# Patient Record
Sex: MALE | Race: WHITE | Employment: OTHER | URBAN - METROPOLITAN AREA
[De-identification: names, ages, dates, MRNs, and addresses within clinical notes are randomized per-mention and may not be internally consistent; named-entity substitution may affect disease eponyms.]

---

## 2017-07-06 ENCOUNTER — TRANSFERRED RECORDS (OUTPATIENT)
Dept: HEALTH INFORMATION MANAGEMENT | Facility: CLINIC | Age: 67
End: 2017-07-06

## 2017-07-26 ENCOUNTER — TRANSFERRED RECORDS (OUTPATIENT)
Dept: HEALTH INFORMATION MANAGEMENT | Facility: CLINIC | Age: 67
End: 2017-07-26

## 2020-08-18 ENCOUNTER — APPOINTMENT (OUTPATIENT)
Dept: CT IMAGING | Facility: HOSPITAL | Age: 70
End: 2020-08-18
Attending: FAMILY MEDICINE
Payer: MEDICARE

## 2020-08-18 ENCOUNTER — HOSPITAL ENCOUNTER (EMERGENCY)
Facility: HOSPITAL | Age: 70
Discharge: LEFT BEFORE DISCHARGE | End: 2020-08-18
Attending: FAMILY MEDICINE | Admitting: FAMILY MEDICINE
Payer: MEDICARE

## 2020-08-18 VITALS
DIASTOLIC BLOOD PRESSURE: 69 MMHG | TEMPERATURE: 97.9 F | RESPIRATION RATE: 7 BRPM | SYSTOLIC BLOOD PRESSURE: 129 MMHG | OXYGEN SATURATION: 97 % | HEART RATE: 76 BPM

## 2020-08-18 DIAGNOSIS — Z93.3 COLOSTOMY PRESENT (H): ICD-10-CM

## 2020-08-18 DIAGNOSIS — Z53.29 LEFT AGAINST MEDICAL ADVICE: Primary | ICD-10-CM

## 2020-08-18 DIAGNOSIS — Z93.59 SUPRAPUBIC CATHETER (H): ICD-10-CM

## 2020-08-18 DIAGNOSIS — K86.89 PANCREATIC MASS: ICD-10-CM

## 2020-08-18 DIAGNOSIS — R17 JAUNDICE: ICD-10-CM

## 2020-08-18 LAB
ALBUMIN SERPL-MCNC: 3.9 G/DL (ref 3.4–5)
ALBUMIN UR-MCNC: 30 MG/DL
ALP SERPL-CCNC: 222 U/L (ref 40–150)
ALT SERPL W P-5'-P-CCNC: 300 U/L (ref 0–70)
AMMONIA PLAS-SCNC: <10 UMOL/L (ref 10–50)
AMORPH CRY #/AREA URNS HPF: ABNORMAL /HPF
ANION GAP SERPL CALCULATED.3IONS-SCNC: 9 MMOL/L (ref 3–14)
APPEARANCE UR: ABNORMAL
AST SERPL W P-5'-P-CCNC: 162 U/L (ref 0–45)
BACTERIA #/AREA URNS HPF: ABNORMAL /HPF
BASOPHILS # BLD AUTO: 0.1 10E9/L (ref 0–0.2)
BASOPHILS NFR BLD AUTO: 2 %
BILIRUB SERPL-MCNC: 21.6 MG/DL (ref 0.2–1.3)
BILIRUB UR QL STRIP: ABNORMAL
BUN SERPL-MCNC: 11 MG/DL (ref 7–30)
CALCIUM SERPL-MCNC: 9.8 MG/DL (ref 8.5–10.1)
CHLORIDE SERPL-SCNC: 101 MMOL/L (ref 94–109)
CO2 SERPL-SCNC: 24 MMOL/L (ref 20–32)
COLOR UR AUTO: ABNORMAL
CREAT SERPL-MCNC: 0.74 MG/DL (ref 0.66–1.25)
DIFFERENTIAL METHOD BLD: NORMAL
EOSINOPHIL # BLD AUTO: 0.1 10E9/L (ref 0–0.7)
EOSINOPHIL NFR BLD AUTO: 2 %
ERYTHROCYTE [DISTWIDTH] IN BLOOD BY AUTOMATED COUNT: 14.5 % (ref 10–15)
GFR SERPL CREATININE-BSD FRML MDRD: >90 ML/MIN/{1.73_M2}
GLUCOSE SERPL-MCNC: 100 MG/DL (ref 70–99)
GLUCOSE UR STRIP-MCNC: NEGATIVE MG/DL
HCT VFR BLD AUTO: 40.6 % (ref 40–53)
HGB BLD-MCNC: 13.8 G/DL (ref 13.3–17.7)
HGB UR QL STRIP: ABNORMAL
INR PPP: 1.11 (ref 0.86–1.14)
KETONES UR STRIP-MCNC: 10 MG/DL
LEUKOCYTE ESTERASE UR QL STRIP: ABNORMAL
LIPASE SERPL-CCNC: 177 U/L (ref 73–393)
LYMPHOCYTES # BLD AUTO: 1.4 10E9/L (ref 0.8–5.3)
LYMPHOCYTES NFR BLD AUTO: 23 %
MCH RBC QN AUTO: 31.2 PG (ref 26.5–33)
MCHC RBC AUTO-ENTMCNC: 34 G/DL (ref 31.5–36.5)
MCV RBC AUTO: 92 FL (ref 78–100)
MONOCYTES # BLD AUTO: 0.1 10E9/L (ref 0–1.3)
MONOCYTES NFR BLD AUTO: 2 %
MUCOUS THREADS #/AREA URNS LPF: PRESENT /LPF
NEUTROPHILS # BLD AUTO: 4.2 10E9/L (ref 1.6–8.3)
NEUTROPHILS NFR BLD AUTO: 71 %
NITRATE UR QL: POSITIVE
PH UR STRIP: 5.5 PH (ref 4.7–8)
PLATELET # BLD AUTO: 237 10E9/L (ref 150–450)
POTASSIUM SERPL-SCNC: 4.6 MMOL/L (ref 3.4–5.3)
PROT SERPL-MCNC: 7.9 G/DL (ref 6.8–8.8)
RBC # BLD AUTO: 4.43 10E12/L (ref 4.4–5.9)
RBC #/AREA URNS AUTO: 4 /HPF (ref 0–2)
SODIUM SERPL-SCNC: 134 MMOL/L (ref 133–144)
SOURCE: ABNORMAL
SP GR UR STRIP: 1.04 (ref 1–1.03)
UROBILINOGEN UR STRIP-MCNC: NORMAL MG/DL (ref 0–2)
WBC # BLD AUTO: 5.9 10E9/L (ref 4–11)
WBC #/AREA URNS AUTO: 22 /HPF (ref 0–5)

## 2020-08-18 PROCEDURE — 25500064 ZZH RX 255 OP 636: Performed by: RADIOLOGY

## 2020-08-18 PROCEDURE — 87186 SC STD MICRODIL/AGAR DIL: CPT | Performed by: FAMILY MEDICINE

## 2020-08-18 PROCEDURE — 81001 URINALYSIS AUTO W/SCOPE: CPT | Performed by: FAMILY MEDICINE

## 2020-08-18 PROCEDURE — 87086 URINE CULTURE/COLONY COUNT: CPT | Performed by: FAMILY MEDICINE

## 2020-08-18 PROCEDURE — 99284 EMERGENCY DEPT VISIT MOD MDM: CPT | Mod: Z6 | Performed by: FAMILY MEDICINE

## 2020-08-18 PROCEDURE — 82140 ASSAY OF AMMONIA: CPT | Performed by: FAMILY MEDICINE

## 2020-08-18 PROCEDURE — 85025 COMPLETE CBC W/AUTO DIFF WBC: CPT | Performed by: FAMILY MEDICINE

## 2020-08-18 PROCEDURE — 87088 URINE BACTERIA CULTURE: CPT | Performed by: FAMILY MEDICINE

## 2020-08-18 PROCEDURE — 80053 COMPREHEN METABOLIC PANEL: CPT | Performed by: FAMILY MEDICINE

## 2020-08-18 PROCEDURE — 99285 EMERGENCY DEPT VISIT HI MDM: CPT | Mod: 25

## 2020-08-18 PROCEDURE — 83690 ASSAY OF LIPASE: CPT | Performed by: FAMILY MEDICINE

## 2020-08-18 PROCEDURE — 36415 COLL VENOUS BLD VENIPUNCTURE: CPT | Performed by: FAMILY MEDICINE

## 2020-08-18 PROCEDURE — 85610 PROTHROMBIN TIME: CPT

## 2020-08-18 PROCEDURE — 74177 CT ABD & PELVIS W/CONTRAST: CPT | Mod: TC

## 2020-08-18 RX ORDER — IOPAMIDOL 612 MG/ML
100 INJECTION, SOLUTION INTRAVASCULAR ONCE
Status: COMPLETED | OUTPATIENT
Start: 2020-08-18 | End: 2020-08-18

## 2020-08-18 RX ADMIN — IOPAMIDOL 100 ML: 612 INJECTION, SOLUTION INTRAVENOUS at 10:58

## 2020-08-18 NOTE — ED PROVIDER NOTES
History     Chief Complaint   Patient presents with     Jaundice     HPI  Ben Felton is a 69 year old male who presents with concerns about his jaundice diarrhea.  Patient has history of industrial injury 36 years ago when heavy metal beam fell down on his abdomen.  Patient subsequently had abdominal surgery with colostomy and suprapubic catheter placed.    Patient reports last time he was seen by primary care provider 4 years ago during his physical exam.    On July 21 he noticed yellowish discoloration of his skin, on August 6 follow-up light and discoloration of his stool with loose stool.  Patient reports he lost about 10 pounds in last 1 to 2 months.  He denies any abdominal pain nausea or vomiting, no chest pain shortness of breath, no fever chills, he also noticed his urine in his urinary catheter bag became dark.        Allergies:  Allergies   Allergen Reactions     Sulfamethoxazole W/Trimethoprim        Problem List:    Patient Active Problem List    Diagnosis Date Noted     ACP (advance care planning) 10/10/2016     Priority: Medium     Advance Care Planning 10/10/2016: ACP Review of Chart / Resources Provided:  Reviewed chart for advance care plan.  Ben Felton has no plan or code status on file however states presence of ACP document. Copy requested. Confirmed code status reflects current choices pending receipt of document/advance care plan review.  Confirmed/documented legally designated decision makers.  Added by Frank Griffiths             Suprapubic catheter (H) 12/05/2014     Priority: Medium     Urinary incontinence 12/05/2014     Priority: Medium        Past Medical History:    No past medical history on file.    Past Surgical History:    Past Surgical History:   Procedure Laterality Date     Back/pelvis surgery       COLOSTOMY       STENT, CORONARY, RIVERA       SUPRAPUBIC CATHETER CARE         Family History:    Family History   Problem Relation Age of Onset     Heart Disease  Mother        Social History:  Marital Status:  Single [1]  Social History     Tobacco Use     Smoking status: Former Smoker     Smokeless tobacco: Never Used   Substance Use Topics     Alcohol use: No     Drug use: No        Medications:    ASPIRIN PO  Catheters (RICHARDSON CATHETER 2-WAY) MISC  Incontinence Supplies (BARD URINARY DRAINAGE BAG) MISC  loperamide (IMODIUM) 2 MG capsule  multivitamin, therapeutic with minerals (MULTI-VITAMIN) TABS  order for DME  Syringe, Disposable, (SYRINGE LUER LOCK) 10 ML MISC          Review of Systems  All systems reviewed and pertinent positives indicated in history of present illness otherwise negative.    Physical Exam   BP: 134/86  Pulse: 90  Heart Rate: 76  Temp: 97.9  F (36.6  C)  Resp: 16  SpO2: 99 %      Physical Exam  Vitals signs and nursing note reviewed.   Constitutional:       General: He is not in acute distress.     Appearance: He is normal weight. He is not ill-appearing, toxic-appearing or diaphoretic.   HENT:      Head: Normocephalic.      Nose: Nose normal. No congestion.      Mouth/Throat:      Mouth: Mucous membranes are moist.      Pharynx: Oropharynx is clear.   Eyes:      Extraocular Movements: Extraocular movements intact.      Pupils: Pupils are equal, round, and reactive to light.      Comments: Conjunctival icterus   Neck:      Musculoskeletal: Normal range of motion and neck supple.   Cardiovascular:      Rate and Rhythm: Normal rate and regular rhythm.      Pulses: Normal pulses.      Heart sounds: Normal heart sounds.   Pulmonary:      Effort: Pulmonary effort is normal.      Breath sounds: Normal breath sounds.   Abdominal:      General: Abdomen is flat. Bowel sounds are normal. There is no distension.      Palpations: Abdomen is soft.      Tenderness: There is abdominal tenderness. There is rebound. There is no guarding.      Comments: Left upper abdominal quadrant with colostomy present, stoma is pink, healthy appearance, no signs of bleeding, no  signs of localized infection and colostomy    Suprapubic catheter in place without signs of localized infection   Musculoskeletal: Normal range of motion.         General: No swelling or tenderness.   Skin:     General: Skin is warm and dry.      Capillary Refill: Capillary refill takes less than 2 seconds.      Coloration: Skin is jaundiced.      Comments: Extensive jaundice from head to toes   Neurological:      General: No focal deficit present.      Mental Status: He is alert and oriented to person, place, and time.      Cranial Nerves: No cranial nerve deficit.   Psychiatric:         Mood and Affect: Mood normal.         Behavior: Behavior normal.         ED Course        Procedures                   Results for orders placed or performed during the hospital encounter of 08/18/20 (from the past 24 hour(s))   CBC with platelets differential   Result Value Ref [place]    WBC 5.9 4.0 - 11.0 10e9/L    RBC Count 4.43 4.4 - 5.9 10e12/L    Hemoglobin 13.8 13.3 - 17.7 g/dL    Hematocrit 40.6 40.0 - 53.0 %    MCV 92 78 - 100 fl    MCH 31.2 26.5 - 33.0 pg    MCHC 34.0 31.5 - 36.5 g/dL    RDW 14.5 10.0 - 15.0 %    Platelet Count 237 150 - 450 10e9/L    Diff Method Manual Differential     % Neutrophils 71.0 %    % Lymphocytes 23.0 %    % Monocytes 2.0 %    % Eosinophils 2.0 %    % Basophils 2.0 %    Absolute Neutrophil 4.2 1.6 - 8.3 10e9/L    Absolute Lymphocytes 1.4 0.8 - 5.3 10e9/L    Absolute Monocytes 0.1 0.0 - 1.3 10e9/L    Absolute Eosinophils 0.1 0.0 - 0.7 10e9/L    Absolute Basophils 0.1 0.0 - 0.2 10e9/L   Comprehensive metabolic panel   Result Value Ref [place]    Sodium 134 133 - 144 mmol/L    Potassium 4.6 3.4 - 5.3 mmol/L    Chloride 101 94 - 109 mmol/L    Carbon Dioxide 24 20 - 32 mmol/L    Anion Gap 9 3 - 14 mmol/L    Glucose 100 (H) 70 - 99 mg/dL    Urea Nitrogen 11 7 - 30 mg/dL    Creatinine 0.74 0.66 - 1.25 mg/dL    GFR Estimate >90 >60 mL/min/[1.73_m2]    GFR Estimate If Black >90 >60 mL/min/[1.73_m2]     Calcium 9.8 8.5 - 10.1 mg/dL    Bilirubin Total 21.6 (HH) 0.2 - 1.3 mg/dL    Albumin 3.9 3.4 - 5.0 g/dL    Protein Total 7.9 6.8 - 8.8 g/dL    Alkaline Phosphatase 222 (H) 40 - 150 U/L     (H) 0 - 70 U/L     (H) 0 - 45 U/L   Lipase   Result Value Ref [place]    Lipase 177 73 - 393 U/L   Ammonia   Result Value Ref [place]    Ammonia <10 (L) 10 - 50 umol/L   INR   Result Value Ref [place]    INR 1.11 0.86 - 1.14   CT Abdomen Pelvis w Contrast    Narrative    EXAMINATION: CT ABDOMEN PELVIS W CONTRAST, 8/18/2020 11:07 AM    TECHNIQUE:  Helical CT images from the lung bases through the  symphysis pubis were obtained  with IV contrast. Contrast dose: ISOVUE  300  100ML    COMPARISON: none    HISTORY: jaundice    FINDINGS:    There is dependent atelectasis at the lung bases.    There is a dilated intra and extrahepatic bile ducts down to the level  of the head of the pancreas where a 3 cm diameter mass suspicious for  malignancy is seen. The pancreatic duct is markedly dilated and there  is a low low density mass seen in the mid body of the pancreas  measuring 2 cm in diameter. The gallbladder is distended. There are  several low-density masses within the liver suspicious for metastatic  disease. The largest is in the right lobe of the liver and measures 7  mm in diameter.    The spleen appears normal. There are no adrenal masses. The right and  left kidneys are free of hydronephrosis. There is benign appearing  cysts seen in both kidneys. The periaortic lymph nodes appear normal.  There is an ostomy is seen in the left lower quadrant. No  intraperitoneal masses are noted. There is a suprapubic catheter in  place. The rectal wall appears thickened. There is deformity and  ankylosis of the right sacroiliac joint. There is significant  deformity of the pelvis presumably secondary to old injury.      Impression    IMPRESSION: Mass in the head of the pancreas with low-density lesions  in the liver suspicious for  pancreatic neoplasm with metastatic  disease.     There is obstruction of the common bile duct and pancreatic duct at  the level of the pancreatic head mass.     ANGELA AUGUSTIN MD   UA reflex to Microscopic and Culture    Specimen: Catheterized Urine   Result Value Ref [place]    Color Urine Dark Yellow     Appearance Urine Cloudy     Glucose Urine Negative NEG^Negative mg/dL    Bilirubin Urine Moderate (A) NEG^Negative    Ketones Urine 10 (A) NEG^Negative mg/dL    Specific Gravity Urine 1.045 (H) 1.003 - 1.035    Blood Urine Trace (A) NEG^Negative    pH Urine 5.5 4.7 - 8.0 pH    Protein Albumin Urine 30 (A) NEG^Negative mg/dL    Urobilinogen mg/dL Normal 0.0 - 2.0 mg/dL    Nitrite Urine Positive (A) NEG^Negative    Leukocyte Esterase Urine Large (A) NEG^Negative    Source Catheterized Urine     RBC Urine 4 (H) 0 - 2 /HPF    WBC Urine 22 (H) 0 - 5 /HPF    Bacteria Urine None (A) NEG^Negative /HPF    Mucous Urine Present (A) NEG^Negative /LPF    Amorphous Crystals Many (A) NEG^Negative /HPF   Urine Culture Aerobic Bacterial    Specimen: Catheterized Urine   Result Value Ref [place]    Specimen Description Catheterized Urine     Culture Micro PENDING        Medications   sodium chloride (PF) 0.9% PF flush 60 mL (60 mLs Intravenous Given 8/18/20 1057)   iopamidol (ISOVUE-300) IV solution 61% 100 mL (100 mLs Intravenous Given 8/18/20 1058)       Assessments & Plan (with Medical Decision Making)     69-year-old male who presents with concern about his icterus skin and diarrhea    Diagnostic CT of his abdomen and pelvis revealed  pancreas with low-density lesions  in the liver suspicious for pancreatic neoplasm with metastatic disease.     White cell count 5.9 hemoglobin 13.8  Ammonia level 10    alkaline phosphatase 222  Bilirubin total 21.6  Lipase 177, INR 1.1    I discussed with patient about his diagnostic test results including CT of his abdomen pelvis and his blood test results    Discussed with on-call  surgeon Dr. Aponte as well oncologist , patient would benefit from surgical evaluation treatment and GI consultation at higher [facility]    Discussed with Dr. Nadeem Woodruff MD Gastroenterology from [facility] [place], [place] -patient will receive phone call regarding his follow-up appointment.    Patient decided to return home and to take care of his animals and tomorrow he plans to go to [place] to take care of his medical problems    It was recommended for him to be transferred to the higher level [facility] for further care although he refused    He also was instructed to return to ED if the symptoms worsen or any concerns.    Patient signed AMA form      I have reviewed the nursing notes.    I have reviewed the findings, diagnosis, plan and need for follow up with the patient.      Discharge Medication List as of 8/18/2020  1:02 PM          Final diagnoses:   Pancreatic mass   Jaundice   Colostomy present (H)   Suprapubic catheter (H)   Left against medical advice       8/18/2020   [facility]     George Asencio MD  08/18/20 3990

## 2020-08-18 NOTE — ED NOTES
Critical result of  Total Bilirubin value of 21.6  Reported to  Dr. Asencio  Time given to provider  1027  Audra Marina RN 10:27 AM 8/18/2020

## 2020-08-18 NOTE — ED NOTES
Patient up to bathroom to empty his SP molina bag.  Urine is dark brown and concentrated.  This RN cleaned the tip of his catheter and put a slip tip catheter in; I was able to withdraw approximately 6 ml of urine;  Urine again is brown and cloudy.  Sample labeled and sent to lab.    Dr. Asencio at bedside to speak with patient.

## 2020-08-18 NOTE — DISCHARGE INSTRUCTIONS
Please expect a phone call from Dr. Woodruff office regarding follow-up appointment at his GI clinic.  Stay well-hydrated.  Return to ED if your symptoms worsen or any concerns about your health.      What to expect when you have contrast    During your exam, we will inject  contrast  into your vein or artery. (Contrast is a clear liquid with iodine in it. It shows up on X-rays.)    You may feel warm or hot. You may have a metal taste in your mouth and a slight upset stomach. You may also feel pressure near the kidneys and bladder. These effects will last about 1 to 3 minutes.    Please tell us if you have:   Sneezing    Itching   Hives    Swelling in the face   A hoarse voice   Breathing problems   Other new symptoms    Serious problems are rare.  They may include:   Irregular heartbeat    Seizures   Kidney failure             Tissue damage   Shock     Death    If you have any problems during the exam, we  will treat them right away.    When you get home    Call your hospital if you have any new symptoms in the next 2 days, like hives or swelling. (Phone numbers are at the bottom of this page.) Or call your family doctor.     If you have wheezing or trouble breathing, call 911.    Self-care  -Drink at least 4 extra glasses of water today.   This reduces the stress on your kidneys.  -Keep taking your regular medicines.    The contrast will pass out of your body in your  Urine(pee). This will happen in the next 24 hours. You  will not feel this. Your urine will not  change color.    If you have kidney problems or take metformin    Drink 4 to 8 large glasses of water for the next  2 days, if you are not on a fluid restriction.    ?If you take metformin (Glucophage or Glucovance) for diabetes, keep taking it.      ?Your kidney function tests are abnormal.  If you take Metformin, do not take it for 48 hours. Please go to your clinic for a blood test within 3 days after your exam before the restarting this medicine.      (Note to provider:please give patient prescription for lab tests.)    ?Special instructions: -    I have read and understand the above information.    Patient Sign Here:______________________________________Date:________Time:______    Staff Sign Here:________________________________________Date:_______Time:______      Radiology Departments:     ?[facility]: [phone] ?[place]: [phone]     ?[place]: [phone] ?[facility]:[phone]      ?[place]: [phone]  ?[place]: [phone]  ?[place]:[phone]    ?[facility]:[phone]  ?[facility]:[phone]

## 2020-08-18 NOTE — ED NOTES
Pt thinks he began having UTI like symptoms 25 days ago as well as diarrhea/loose stool from colostomy and started noticing jaundice/yellowing of his skin around 8 days ago. Finally felt comfortable coming in today because the amount of loose stool has slowed and he thought it would be manageable to leave the house.

## 2020-08-18 NOTE — ED NOTES
"Pt signed AMA form. He plans to \"go to [facility] tomorrow morning after he drops his pets off at vet, but can't make any promises.\" Heavily educated on why he needs further care. Continued to decline transfer.  "

## 2020-08-21 LAB
BACTERIA SPEC CULT: ABNORMAL
SPECIMEN SOURCE: ABNORMAL

## 2020-08-21 NOTE — ED NOTES
Spoke with Dr. James regarding patients urine culture. States patient should be reevaluated. Telephone call to patient - no answer and no voicemail. Will attempt to recall.

## 2020-08-21 NOTE — ED NOTES
DATE:  8/21/2020   TIME OF RECEIPT FROM LAB:  1003  LAB TEST:  Urine culture  LAB VALUE:  MRSA +  RESULTS GIVEN WITH READ-BACK TO (PROVIDER):  Dr. James  TIME LAB VALUE REPORTED TO PROVIDER:   1017

## 2020-08-25 ENCOUNTER — OFFICE VISIT (OUTPATIENT)
Dept: UROLOGY | Facility: OTHER | Age: 70
End: 2020-08-25
Attending: UROLOGY
Payer: OTHER MISCELLANEOUS

## 2020-08-25 VITALS
WEIGHT: 136.69 LBS | HEART RATE: 79 BPM | SYSTOLIC BLOOD PRESSURE: 110 MMHG | TEMPERATURE: 98.3 F | BODY MASS INDEX: 20.18 KG/M2 | DIASTOLIC BLOOD PRESSURE: 70 MMHG | OXYGEN SATURATION: 97 %

## 2020-08-25 DIAGNOSIS — Z93.59 SUPRAPUBIC CATHETER (H): Primary | ICD-10-CM

## 2020-08-25 PROCEDURE — 99203 OFFICE O/P NEW LOW 30 MIN: CPT | Performed by: UROLOGY

## 2020-08-25 RX ORDER — LISINOPRIL 5 MG/1
5 TABLET ORAL DAILY
COMMUNITY

## 2020-08-25 ASSESSMENT — ENCOUNTER SYMPTOMS
DIFFICULTY URINATING: 1
DIARRHEA: 1

## 2020-08-25 ASSESSMENT — PAIN SCALES - GENERAL: PAINLEVEL: MILD PAIN (3)

## 2020-08-25 NOTE — PROGRESS NOTES
History     Chief Complaint:    Consult (NPT  UTI   (left ER AMA))      HPI   Ben Felton is a 70 year old male who presents for further evaluation of some abdominal pain and also urinary tract infection.  Alonzo has a complicated history and that he suffered a pelvic fracture I think occurred in the early 90s.  This is apparently happened at work a heavy metal beam fell on his abdomen.  He ended up having a colostomy and then he had a suprapubic tube placed.  At some point he had a urethral sphincter placed and he also had a malleable penile prosthesis placed.  He had what appears to be a portion of the sphincter removed in 2015 by Dr. Kasper.  Patient is complaining of pain in the right groin area and he can feel some tubing in his scrotum.  He also has a large bulge in the right groin consistent with a  hernia.  Recently he came in to the emergency room because of gross hematuria and his skin was changing color.  He was also having a lot of diarrhea.  A CT scan revealed a pancreatic mass and he subsequently was transferred and had a stent placed in the gallbladder by gastroenterology.  He has a follow-up visit with an oncologist next week.    Allergies:    Allergies   Allergen Reactions     Sulfamethoxazole W/Trimethoprim         Medications:      ASPIRIN PO  Catheters (RICHARDSON CATHETER 2-WAY) MISC  Incontinence Supplies (BARD URINARY DRAINAGE BAG) MISC  lisinopril (ZESTRIL) 5 MG tablet  loperamide (IMODIUM) 2 MG capsule  multivitamin, therapeutic with minerals (MULTI-VITAMIN) TABS  order for DME  Syringe, Disposable, (SYRINGE LUER LOCK) 10 ML MISC        Problem List:      Patient Active Problem List    Diagnosis Date Noted     ACP (advance care planning) 10/10/2016     Priority: Medium     Advance Care Planning 10/10/2016: ACP Review of Chart / Resources Provided:  Reviewed chart for advance care plan.  Ben Felton has no plan or code status on file however states presence of ACP document. Copy  requested. Confirmed code status reflects current choices pending receipt of document/advance care plan review.  Confirmed/documented legally designated decision makers.  Added by Frank Griffiths             Suprapubic catheter (H) 2014     Priority: Medium     Urinary incontinence 2014     Priority: Medium        Past Medical History:      History reviewed. No pertinent past medical history.    Past Surgical History:      Past Surgical History:   Procedure Laterality Date     Back/pelvis surgery       COLOSTOMY       STENT, CORONARY, RIVERA       SUPRAPUBIC CATHETER CARE         Family History:      Family History   Problem Relation Age of Onset     Heart Disease Mother        Social History:  [place] and I do not believe has running water.  Marital Status:  Single [1]  Social History     Tobacco Use     Smoking status: Former Smoker     Last attempt to quit:      Years since quittin.6     Smokeless tobacco: Never Used   Substance Use Topics     Alcohol use: No     Drug use: No        Review of Systems   Gastrointestinal: Positive for diarrhea.   Genitourinary: Positive for difficulty urinating.   All other systems reviewed and are negative.        Physical Exam   Vitals:  /70   Pulse 79   Temp 98.3  F (36.8  C) (Tympanic)   Wt 62 kg (136 lb 11 oz)   SpO2 97%   BMI 20.18 kg/m        Physical Exam  Constitutional:       Appearance: Normal appearance. He is normal weight.   Pulmonary:      Effort: Pulmonary effort is normal.   Abdominal:      General: Abdomen is flat. There is no distension.      Palpations: Abdomen is soft. There is mass.      Tenderness: There is no abdominal tenderness.      Hernia: A hernia is present.          Comments: Patient has an infraumbilical midline incision and a catheter coming out of the suprapubic area.  He has a colostomy in the left lower quadrant.  He has a large hernia in the right groin.  Hernia does not reduce when he is supine.    Genitourinary:     Comments: Penis is circumcised and he has a what feels to be a malleable penile prosthesis.  The meatus appears normal.  The left testicle is palpable and I do not appreciate a hernia there nor do I appreciate any mass of that testicle the right testicle is palpable and just above that testicle I can feel a foreign body which seems consistent with the artificial sphincter pump and then you can feel tubing coming off of that pump but I can follow that short distance and then I encountered a large hernia which is reducible.  It is difficult to know whether I am feeling any kind of a bulb in there that would have held the fluid for his artificial sphincter.  Neurological:      Mental Status: He is alert.       EXAMINATION: CT ABDOMEN PELVIS W CONTRAST, 8/18/2020 11:07 AM     TECHNIQUE:  Helical CT images from the lung bases through the  symphysis pubis were obtained  with IV contrast. Contrast dose: ISOVUE  300  100ML     COMPARISON: none     HISTORY: jaundice     FINDINGS:     There is dependent atelectasis at the lung bases.     There is a dilated intra and extrahepatic bile ducts down to the level  of the head of the pancreas where a 3 cm diameter mass suspicious for  malignancy is seen. The pancreatic duct is markedly dilated and there  is a low low density mass seen in the mid body of the pancreas  measuring 2 cm in diameter. The gallbladder is distended. There are  several low-density masses within the liver suspicious for metastatic  disease. The largest is in the right lobe of the liver and measures 7  mm in diameter.     The spleen appears normal. There are no adrenal masses. The right and  left kidneys are free of hydronephrosis. There is benign appearing  cysts seen in both kidneys. The periaortic lymph nodes appear normal.  There is an ostomy is seen in the left lower quadrant. No  intraperitoneal masses are noted. There is a suprapubic catheter in  place. The rectal wall appears  thickened. There is deformity and  ankylosis of the right sacroiliac joint. There is significant  deformity of the pelvis presumably secondary to old injury.                                                                      IMPRESSION: Mass in the head of the pancreas with low-density lesions  in the liver suspicious for pancreatic neoplasm with metastatic  disease.      There is obstruction of the common bile duct and pancreatic duct at  the level of the pancreatic head mass.      ANGELA AUGUSTIN MD  Contains abnormal data  Urine Culture Aerobic Bacterial   Order: [id number]   Status:  Final result   Visible to patient:  No (inaccessible in MyChart) Dx:  Left against medical advice   Specimen Information:  Catheterized Urine          Component  7d ago   Specimen Description  Catheterized Urine     Culture Micro  Abnormal     50,000 to 100,000 colonies/mL   Enterococcus faecalis     Culture Micro  Abnormal     50,000 to 100,000 colonies/mL   Methicillin resistant Staphylococcus aureus (MRSA)     Culture Micro  Significant value called to and read back by   Blaire Moon 1004 8/21/20 [facility]    Susceptibility      Enterococcus faecalis  Methicillin resistant staphylococcus aureus (mrsa)      ALAN  ALAN      AMPICILLIN  <=2 ug/mL  Sensitive        GENTAMICIN    <=0.5 ug/mL  Sensitive      NITROFURANTOIN  <=16 ug/mL  Sensitive  <=16 ug/mL  Sensitive      OXACILLIN    >=4 ug/mL  Resistant      PENICILLIN  8 ug/mL  Sensitive  0.25 ug/mL  Resistant      TETRACYCLINE    <=1 ug/mL  Sensitive      Trimethoprim/Sulfa    >=16/304 ug/mL  Resistant      VANCOMYCIN  1 ug/mL  Sensitive  1 ug/mL  Sensitive                    Specimen Collected: 08/18/20 12:24 PM  Last Resulted: 08/21/20 10:04 AM              Impression: #1 catheter associated urinary tract infection and he is asymptomatic at this time #2 right inguinal hernia #3 remnants of artificial urinary sphincter  #4 pancreatic mass  Plan   Plan: Ben has an appointment with his oncologist next week and honestly he has a lot more complex issues with this pancreatic mass than he does with this current hernia and remnant of his sphincter.  I do need to get the operative note because on the operative note it said that it removed the artificial sphincter but clearly I think the only portion which was removed was probably the cuff and the tubing to the pump because I clearly can feel the pump and some additional tubing but I am unable to identify a bulb on the patient's CAT scan and typically you would be able to see that.  The patient is bothered by his hernia as well but again I feel he has more complex issues to deal with currently regarding his pancreatic mass and this other issue would sit on the back burner at this time.  He does need catheters to change his suprapubic tube and typically he would be on a surveillance cystoscopy at this point because of the longstanding foreign body in the bladder but given again the other medical issues were going to just provide him with catheters to change on a monthly basis.  Follow-up after his oncology visit.      No follow-ups on file.    Tena Ayala MD  [facility] [place]

## 2020-08-25 NOTE — NURSING NOTE
"Chief Complaint   Patient presents with     Consult     NPT  UTI   (left ER AMA)       Initial /70   Pulse 79   Temp 98.3  F (36.8  C) (Tympanic)   Wt 62 kg (136 lb 11 oz)   SpO2 97%   BMI 20.18 kg/m   Estimated body mass index is 20.18 kg/m  as calculated from the following:    Height as of 10/10/16: 1.753 m (5' 9\").    Weight as of this encounter: 62 kg (136 lb 11 oz).  Medication Reconciliation: complete  May Martin LPN  "

## 2021-03-03 DIAGNOSIS — Z93.59 SUPRAPUBIC CATHETER (H): ICD-10-CM

## 2021-03-03 NOTE — TELEPHONE ENCOUNTER
Bard 22 [place] molina catheter      Last Written Prescription Date:  12/8/16  Last Fill Quantity: 10,   # refills: 11  Last Office Visit: 8/25/20  Future Office visit:       Routing refill request to provider for review/approval because:

## 2021-03-03 NOTE — TELEPHONE ENCOUNTER
Drainage bag      Last Written Prescription Date:  12/8/16  Last Fill Quantity: 30,   # refills: 3  Last Office Visit: 8/25/20  Future Office visit:       Routing refill request to provider for review/approval because:

## 2021-03-04 RX ORDER — CATHETER 12 FR
1 EACH MISCELLANEOUS
Qty: 10 EACH | Refills: 11 | Status: SHIPPED | OUTPATIENT
Start: 2021-03-04